# Patient Record
Sex: FEMALE | Race: WHITE | Employment: UNEMPLOYED | URBAN - METROPOLITAN AREA
[De-identification: names, ages, dates, MRNs, and addresses within clinical notes are randomized per-mention and may not be internally consistent; named-entity substitution may affect disease eponyms.]

---

## 2019-11-25 ENCOUNTER — HOSPITAL ENCOUNTER (EMERGENCY)
Age: 54
Discharge: HOME | End: 2019-11-25
Attending: EMERGENCY MEDICINE | Admitting: EMERGENCY MEDICINE
Payer: MEDICARE

## 2019-11-25 VITALS
HEART RATE: 89 BPM | SYSTOLIC BLOOD PRESSURE: 98 MMHG | BODY MASS INDEX: 38.57 KG/M2 | WEIGHT: 240 LBS | DIASTOLIC BLOOD PRESSURE: 56 MMHG | RESPIRATION RATE: 18 BRPM | TEMPERATURE: 98.1 F | HEIGHT: 66 IN | OXYGEN SATURATION: 91 %

## 2019-11-25 DIAGNOSIS — T78.40XA ALLERGIC REACTION, INITIAL ENCOUNTER: Primary | ICD-10-CM

## 2019-11-25 PROCEDURE — 74011250637 HC RX REV CODE- 250/637: Performed by: EMERGENCY MEDICINE

## 2019-11-25 PROCEDURE — A9270 NON-COVERED ITEM OR SERVICE: HCPCS | Performed by: EMERGENCY MEDICINE

## 2019-11-25 PROCEDURE — 74011636637 HC RX REV CODE- 636/637: Performed by: EMERGENCY MEDICINE

## 2019-11-25 PROCEDURE — 99285 EMERGENCY DEPT VISIT HI MDM: CPT

## 2019-11-25 RX ORDER — DIPHENHYDRAMINE HCL 12.5MG/5ML
12.5 LIQUID (ML) ORAL
Status: COMPLETED | OUTPATIENT
Start: 2019-11-25 | End: 2019-11-25

## 2019-11-25 RX ORDER — PREDNISOLONE 15 MG/5ML
1 SOLUTION ORAL
Status: DISCONTINUED | OUTPATIENT
Start: 2019-11-25 | End: 2019-11-25

## 2019-11-25 RX ORDER — PREDNISOLONE 15 MG/5ML
0.5 SOLUTION ORAL DAILY
Qty: 90 ML | Refills: 0 | Status: SHIPPED | OUTPATIENT
Start: 2019-11-25 | End: 2019-11-30

## 2019-11-25 RX ORDER — FAMOTIDINE 20 MG/1
20 TABLET, FILM COATED ORAL
Status: DISCONTINUED | OUTPATIENT
Start: 2019-11-25 | End: 2019-11-25

## 2019-11-25 RX ORDER — EPINEPHRINE 0.3 MG/.3ML
0.3 INJECTION SUBCUTANEOUS
Qty: 1 SYRINGE | Refills: 0 | Status: SHIPPED | OUTPATIENT
Start: 2019-11-25 | End: 2019-11-25

## 2019-11-25 RX ORDER — PREDNISOLONE 15 MG/5ML
1 SOLUTION ORAL DAILY
Qty: 183 ML | Refills: 0 | Status: SHIPPED | OUTPATIENT
Start: 2019-11-25 | End: 2019-11-25

## 2019-11-25 RX ORDER — PREDNISOLONE 15 MG/5ML
60 SOLUTION ORAL
Status: COMPLETED | OUTPATIENT
Start: 2019-11-25 | End: 2019-11-25

## 2019-11-25 RX ADMIN — PREDNISOLONE 60 MG: 15 SOLUTION ORAL at 12:14

## 2019-11-25 RX ADMIN — DIPHENHYDRAMINE HYDROCHLORIDE 12.5 MG: 25 LIQUID ORAL at 11:24

## 2019-11-25 NOTE — ED NOTES
Pt discharged at this time. VSS at time of discharge. Discharge instructions reviewed and follow up care discussed at this time. Discussed 1-3 hour wait time for transportation. Pt verbalized understanding and denies any questions at this time. Prescriptions x2 given with planned pick-up at hospital pharmacy. Pt dressed and ambulated out of ED at this time.  All care provided within pt best interest.

## 2019-11-25 NOTE — ED NOTES
Pt stating she is not comfortable taking the prescribed amount of prednisolone at this time. MD Ramona with another patient at this time but will be made aware.

## 2019-11-25 NOTE — ED TRIAGE NOTES
Pt presents via EMS for allergic reaction. Pt has allergy to yellow dyes and took prescribed singulair this AM. Approx 20-30min later pt began to have itching on her back and swelling under her tongue, nose, and bottom lip. Pt took 50mg benadryl and used prescribed Epipen at home. Pt immediately began feeling relief from allergic reaction symptoms but did begin to feel shaky. Pt requesting to use the bathroom at this time, escorted to the bathroom. Pt stable during ambulation.

## 2019-11-25 NOTE — ED PROVIDER NOTES
EMERGENCY DEPARTMENT HISTORY AND PHYSICAL EXAM      Date: 11/25/2019  Patient Name: Nakul Benavides    Please note that this dictation was completed with [employer], the computer voice recognition software. Quite often unanticipated grammatical, syntax, homophones, and other interpretive errors are inadvertently transcribed by the computer software. Please disregard these errors. Please excuse any errors that have escaped final proofreading. History of Presenting Illness     Chief Complaint   Patient presents with    Allergic Reaction       History Provided By: Patient    HPI: Nakul Benavides, 47 y.o. female, presented emergency department complaining of allergic reaction. Patient states she has multiple allergies, mostly to dyes and fillers and medications and reports that she started taking 2 medicines again last night, Singulair and one other medicine and then developed a rash on her chest and back, lip and tongue swelling and shortness of breath. She used her EpiPen and took 50 mg of Benadryl. She reports improvement of her symptoms, came in by EMS, symptoms have basically resolved except for a little perioral tingling. No shortness of breath or wheezing at this time. No lip or tongue swelling at this time, no rash. PCP: Jillian Kent MD    No current facility-administered medications on file prior to encounter. No current outpatient medications on file prior to encounter. Past History     Past Medical History:  Past Medical History:   Diagnosis Date    Arthritis     gout; osteoarthritis    Asthma     Cushing's syndrome ([address]     Diabetes ([address]     Gastrointestinal disorder     GERD, gastroporesis, diverticulitis    Hypertension     Thromboembolus ([address]        Past Surgical History:  Past Surgical History:   Procedure Laterality Date    ABDOMEN SURGERY PROC UNLISTED      HX CHOLECYSTECTOMY      HX ORTHOPAEDIC         Family History:  History reviewed.  No pertinent family history. Social History:  Social History     Tobacco Use    Smoking status: Former Smoker    Smokeless tobacco: Never Used   Substance Use Topics    Alcohol use: Yes     Comment: very rarely-2x/year    Drug use: Not on file       Allergies: Allergies   Allergen Reactions    Latex Anaphylaxis    Cephalosporins Anaphylaxis    Ciprofloxacin Anaphylaxis    Ibuprofen Anaphylaxis    Keflex [Cephalexin] Anaphylaxis    Macrobid [Nitrofurantoin Monohyd/M-Cryst] Anaphylaxis    Miralax [Polyethylene Glycol 3350] Anaphylaxis    Pcn [Penicillins] Anaphylaxis    Peanut Anaphylaxis    Percocet [Oxycodone-Acetaminophen] Anaphylaxis    Prednisone Anaphylaxis and Hives    Shrimp Anaphylaxis    Strawberry Anaphylaxis    Sulfa (Sulfonamide Antibiotics) Anaphylaxis         Review of Systems   Review of Systems   Constitutional: Negative for chills and fever. HENT: Negative for congestion and sore throat. Eyes: Negative for visual disturbance. Respiratory: Positive for cough and shortness of breath. Cardiovascular: Negative for chest pain and leg swelling. Gastrointestinal: Negative for abdominal pain, blood in stool, diarrhea and nausea. Endocrine: Negative for polyuria. Genitourinary: Negative for dysuria, flank pain, vaginal bleeding and vaginal discharge. Musculoskeletal: Negative for myalgias. Skin: Positive for rash. Allergic/Immunologic: Negative for immunocompromised state. Neurological: Negative for weakness and headaches. Psychiatric/Behavioral: Negative for confusion. Physical Exam   Physical Exam  Vitals signs and nursing note reviewed. Constitutional:       Appearance: She is well-developed. HENT:      Head: Normocephalic and atraumatic. Eyes:      General:         Right eye: No discharge. Left eye: No discharge. Conjunctiva/sclera: Conjunctivae normal.      Pupils: Pupils are equal, round, and reactive to light.    Neck:      Musculoskeletal: Normal range of motion and neck supple. Trachea: No tracheal deviation. Cardiovascular:      Rate and Rhythm: Normal rate and regular rhythm. Heart sounds: Normal heart sounds. No murmur. Pulmonary:      Effort: Pulmonary effort is normal. No respiratory distress. Breath sounds: Normal breath sounds. No wheezing or rales. Abdominal:      General: Bowel sounds are normal.      Palpations: Abdomen is soft. Tenderness: There is no tenderness. There is no guarding or rebound. Musculoskeletal: Normal range of motion. General: No tenderness or deformity. Skin:     General: Skin is warm and dry. Findings: No erythema or rash. Neurological:      Mental Status: She is alert and oriented to person, place, and time. Psychiatric:         Behavior: Behavior normal.         Diagnostic Study Results     Labs -   No results found for this or any previous visit (from the past 12 hour(s)). Radiologic Studies -   No orders to display     CT Results  (Last 48 hours)    None        CXR Results  (Last 48 hours)    None            Medical Decision Making   I am the first provider for this patient. I reviewed the vital signs, available nursing notes, past medical history, past surgical history, family history and social history. Vital Signs-Reviewed the patient's vital signs. No data found. Records Reviewed: Nursing Notes and Old Medical Records    Provider Notes (Medical Decision Making):   Patient states that she can take Solu-Medrol, but does not want a full 1 make per kick dose. She also does not want any other histamine receptor antagonist at this time other than a 12.5 mg Benadryl. I will have the patient to decide how much of the prednisolone she wants to take.   I will re-prescribe an EpiPen and have her take Benadryl and ranitidine for the next several days and I have given her a prescription for some prednisone that she can use at her discretion as she is wary about taking steroids. ED Course:   Initial assessment performed. The patients presenting problems have been discussed, and they are in agreement with the care plan formulated and outlined with them. I have encouraged them to ask questions as they arise throughout their visit. Critical Care Time:   none    Disposition:  DISCHARGE NOTE  Patients results have been reviewed with them. Patient and/or family have verbally conveyed their understanding and agreement of the patient's signs, symptoms, diagnosis, treatment and prognosis and additionally agree to follow up as recommended or return to the Emergency Room should their condition change or have any new concerns prior to their follow-up appointment. Patient verbally agrees with the care-plan and verbally conveys that all of their questions have been answered. Discharge instructions have also been provided to the patient with some educational information regarding their diagnosis as well a list of reasons why they would want to return to the ER prior to their follow-up appointment should their condition change. PLAN:  1. Discharge Medication List as of 11/25/2019 11:14 AM      START taking these medications    Details   EPINEPHrine (EPIPEN) 0.3 mg/0.3 mL injection 0.3 mL by IntraMUSCular route once as needed for Anaphylaxis for up to 1 dose., Normal, Disp-1 Syringe, R-0      prednisoLONE (PRELONE) 15 mg/5 mL syrup Take 36.5 mL by mouth daily for 5 days. , Normal, Disp-183 mL, R-0           2. Follow-up Information     Follow up With Specialties Details Why Contact Info    [facility] EMERGENCY DEPT Emergency Medicine  If symptoms worsen [address] [place] [address]  [phone]    Your PCP  Schedule an appointment as soon as possible for a visit            Return to ED if worse     Diagnosis     Clinical Impression:   1. Allergic reaction, initial encounter        Attestations:   This note was completed by Katie Reyes DO

## 2019-11-25 NOTE — DISCHARGE INSTRUCTIONS
Patient Education        Allergic Reaction: Care Instructions  Your Care Instructions    An allergic reaction is an excessive response from your immune system to a medicine, chemical, food, insect bite, or other substance. A reaction can range from mild to life-threatening. Some people have a mild rash, hives, and itching or stomach cramps. In severe reactions, swelling of your tongue and throat can close up your airway so that you cannot breathe. Follow-up care is a key part of your treatment and safety. Be sure to make and go to all appointments, and call your doctor if you are having problems. It's also a good idea to know your test results and keep a list of the medicines you take. How can you care for yourself at home? · If you know what caused your allergic reaction, be sure to avoid it. Your allergy may become more severe each time you have a reaction. · Take an over-the-counter antihistamine, such as cetirizine (Zyrtec) or loratadine (Claritin), to treat mild symptoms. Read and follow directions on the label. Some antihistamines can make you feel sleepy. Do not give antihistamines to a child unless you have checked with your doctor first. Mild symptoms include sneezing or an itchy or runny nose; an itchy mouth; a few hives or mild itching; and mild nausea or stomach discomfort. · Do not scratch hives or a rash. Put a cold, moist towel on them or take cool baths to relieve itching. Put ice packs on hives, swelling, or insect stings for 10 to 15 minutes at a time. Put a thin cloth between the ice pack and your skin. Do not take hot baths or showers. They will make the itching worse. · Your doctor may prescribe a shot of epinephrine to carry with you in case you have a severe reaction. Learn how to give yourself the shot and keep it with you at all times. Make sure it is not .   · Go to the emergency room every time you have a severe reaction, even if you have used your shot of epinephrine and are feeling better. Symptoms can come back after a shot. · Wear medical alert jewelry that lists your allergies. You can buy this at most drugstores. · If your child has a severe allergy, make sure that his or her teachers, babysitters, coaches, and other caregivers know about the allergy. They should have an epinephrine shot, know how and when to give it, and have a plan to take your child to the hospital.  When should you call for help? Give an epinephrine shot if:    · You think you are having a severe allergic reaction.     · You have symptoms in more than one body area, such as mild nausea and an itchy mouth.    After giving an epinephrine shot call 911, even if you feel better.   Call 911 if:    · You have symptoms of a severe allergic reaction. These may include:  ? Sudden raised, red areas (hives) all over your body. ? Swelling of the throat, mouth, lips, or tongue. ? Trouble breathing. ? Passing out (losing consciousness). Or you may feel very lightheaded or suddenly feel weak, confused, or restless.     · You have been given an epinephrine shot, even if you feel better.    Call your doctor now or seek immediate medical care if:    · You have symptoms of an allergic reaction, such as:  ? A rash or hives (raised, red areas on the skin). ? Itching. ? Swelling. ? Belly pain, nausea, or vomiting.    Watch closely for changes in your health, and be sure to contact your doctor if:    · You do not get better as expected. Where can you learn more? Go to http://torres-juancarlos.info/. Enter Q148 in the search box to learn more about \"Allergic Reaction: Care Instructions. \"  Current as of: April 7, 2019  Content Version: 12.2  © 1091-8191 [employer]. Care instructions adapted under license by [employer] (which disclaims liability or warranty for this information).  If you have questions about a medical condition or this instruction, always ask your healthcare professional. [address] any warranty or liability for your use of this information.

## 2019-12-16 ENCOUNTER — HOSPITAL ENCOUNTER (EMERGENCY)
Age: 54
Discharge: HOME | End: 2019-12-16
Attending: EMERGENCY MEDICINE
Payer: MEDICARE

## 2019-12-16 VITALS
TEMPERATURE: 98 F | OXYGEN SATURATION: 92 % | HEIGHT: 66 IN | BODY MASS INDEX: 37.77 KG/M2 | WEIGHT: 235 LBS | SYSTOLIC BLOOD PRESSURE: 100 MMHG | HEART RATE: 91 BPM | DIASTOLIC BLOOD PRESSURE: 60 MMHG | RESPIRATION RATE: 14 BRPM

## 2019-12-16 DIAGNOSIS — L29.9 ITCHING: ICD-10-CM

## 2019-12-16 DIAGNOSIS — R22.0 FACIAL SWELLING: ICD-10-CM

## 2019-12-16 DIAGNOSIS — L50.9 HIVES: ICD-10-CM

## 2019-12-16 DIAGNOSIS — T78.40XA ALLERGIC REACTION, INITIAL ENCOUNTER: Primary | ICD-10-CM

## 2019-12-16 LAB
GLUCOSE BLD STRIP.AUTO-MCNC: 277 MG/DL (ref 65–100)
SERVICE CMNT-IMP: ABNORMAL

## 2019-12-16 PROCEDURE — 74011250637 HC RX REV CODE- 250/637: Performed by: EMERGENCY MEDICINE

## 2019-12-16 PROCEDURE — 74011636637 HC RX REV CODE- 636/637: Performed by: EMERGENCY MEDICINE

## 2019-12-16 PROCEDURE — 99284 EMERGENCY DEPT VISIT MOD MDM: CPT

## 2019-12-16 PROCEDURE — 82962 GLUCOSE BLOOD TEST: CPT

## 2019-12-16 PROCEDURE — A9270 NON-COVERED ITEM OR SERVICE: HCPCS | Performed by: EMERGENCY MEDICINE

## 2019-12-16 RX ORDER — ONDANSETRON 4 MG/1
4 TABLET, FILM COATED ORAL 3 TIMES DAILY
COMMUNITY

## 2019-12-16 RX ORDER — ALLOPURINOL 100 MG/1
100 TABLET ORAL DAILY
COMMUNITY

## 2019-12-16 RX ORDER — RANITIDINE 150 MG/1
150 TABLET, FILM COATED ORAL DAILY
COMMUNITY

## 2019-12-16 RX ORDER — FAMOTIDINE 20 MG/1
20 TABLET, FILM COATED ORAL ONCE
Status: COMPLETED | OUTPATIENT
Start: 2019-12-16 | End: 2019-12-16

## 2019-12-16 RX ORDER — PROMETHAZINE HYDROCHLORIDE 25 MG/1
25 TABLET ORAL
Status: COMPLETED | OUTPATIENT
Start: 2019-12-16 | End: 2019-12-16

## 2019-12-16 RX ORDER — FLUTICASONE PROPIONATE AND SALMETEROL 100; 50 UG/1; UG/1
1 POWDER RESPIRATORY (INHALATION) 2 TIMES DAILY
COMMUNITY

## 2019-12-16 RX ORDER — NEBIVOLOL 10 MG/1
10 TABLET ORAL DAILY
COMMUNITY

## 2019-12-16 RX ORDER — LEVALBUTEROL INHALATION SOLUTION 0.63 MG/3ML
0.63 SOLUTION RESPIRATORY (INHALATION)
COMMUNITY

## 2019-12-16 RX ORDER — ONDANSETRON 4 MG/1
4 TABLET, ORALLY DISINTEGRATING ORAL
Status: DISCONTINUED | OUTPATIENT
Start: 2019-12-16 | End: 2019-12-16

## 2019-12-16 RX ORDER — PREDNISOLONE 15 MG/5ML
20 SOLUTION ORAL DAILY
Status: DISCONTINUED | OUTPATIENT
Start: 2019-12-16 | End: 2019-12-16 | Stop reason: HOSPADM

## 2019-12-16 RX ORDER — FAMOTIDINE 20 MG/1
20 TABLET, FILM COATED ORAL
Status: DISCONTINUED | OUTPATIENT
Start: 2019-12-16 | End: 2019-12-16

## 2019-12-16 RX ADMIN — FAMOTIDINE 20 MG: 20 TABLET ORAL at 08:59

## 2019-12-16 RX ADMIN — PREDNISOLONE 20 MG: 15 SOLUTION ORAL at 08:59

## 2019-12-16 RX ADMIN — PROMETHAZINE HYDROCHLORIDE 25 MG: 25 TABLET ORAL at 08:59

## 2019-12-16 NOTE — ED NOTES
All discharge paperwork reviewed with patient, nurse and MD and she denies any need for further explanation regarding these instructions.  Denies need for wheelchair and ambulates out of ED

## 2019-12-16 NOTE — ED NOTES
Patient states that she took benadryl 50mg and prednisolone as well after feeling the numbness and tingling in her mouth.

## 2019-12-16 NOTE — ED NOTES
Request from Primary RN to assist with d/c home, patient d/c but declines to go home, states need to stay in room. Offered patient wheelchair to waiting room, she states she can use her crutches, but not ready to go yet, waiting patient continues to decline to get up, daughter at bedside. Flor present. Patient continues  being disgruntled and angry to staff.

## 2019-12-16 NOTE — ED TRIAGE NOTES
Pt is able to speak in complete sentences, no visual swelling of mouth, maintaining airway, walking with crutches without shortness of breath

## 2020-02-04 ENCOUNTER — HOSPITAL ENCOUNTER (OUTPATIENT)
Dept: MRI IMAGING | Age: 55
Discharge: HOME | End: 2020-02-04
Payer: MEDICARE

## 2020-02-04 DIAGNOSIS — S83.412A SPRAIN OF MEDIAL COLLATERAL LIGAMENT OF LEFT KNEE: ICD-10-CM

## 2020-02-04 DIAGNOSIS — S83.207A TEAR OF MENISCUS OF LEFT KNEE: ICD-10-CM

## 2020-02-04 PROCEDURE — 73721 MRI JNT OF LWR EXTRE W/O DYE: CPT

## 2022-11-08 ENCOUNTER — APPOINTMENT (OUTPATIENT)
Dept: URBAN - NONMETROPOLITAN AREA CLINIC 42 | Age: 57
Setting detail: DERMATOLOGY
End: 2022-11-10

## 2022-11-08 DIAGNOSIS — L85.8 OTHER SPECIFIED EPIDERMAL THICKENING: ICD-10-CM

## 2022-11-08 DIAGNOSIS — L738 OTHER SPECIFIED DISEASES OF HAIR AND HAIR FOLLICLES: ICD-10-CM

## 2022-11-08 DIAGNOSIS — L663 OTHER SPECIFIED DISEASES OF HAIR AND HAIR FOLLICLES: ICD-10-CM

## 2022-11-08 DIAGNOSIS — H00.1 CHALAZION: ICD-10-CM

## 2022-11-08 DIAGNOSIS — L60.8 OTHER NAIL DISORDERS: ICD-10-CM

## 2022-11-08 PROBLEM — L02.02 FURUNCLE OF FACE: Status: ACTIVE | Noted: 2022-11-08

## 2022-11-08 PROBLEM — H00.15 CHALAZION LEFT LOWER EYELID: Status: ACTIVE | Noted: 2022-11-08

## 2022-11-08 PROCEDURE — OTHER COUNSELING: OTHER

## 2022-11-08 PROCEDURE — OTHER OTC TREATMENT REGIMEN: OTHER

## 2022-11-08 PROCEDURE — OTHER PRESCRIPTION: OTHER

## 2022-11-08 PROCEDURE — 99203 OFFICE O/P NEW LOW 30 MIN: CPT

## 2022-11-08 PROCEDURE — OTHER ADDITIONAL NOTES: OTHER

## 2022-11-08 RX ORDER — CLINDAMYCIN PHOSPHATE 10 MG/ML
LOTION TOPICAL
Qty: 60 | Refills: 1 | Status: ERX | COMMUNITY
Start: 2022-11-08

## 2022-11-08 RX ORDER — ERYTHROMYCIN 5 MG/G
OINTMENT OPHTHALMIC
Qty: 1 | Refills: 0 | Status: ERX | COMMUNITY
Start: 2022-11-08

## 2022-11-08 ASSESSMENT — LOCATION ZONE DERM
LOCATION ZONE: EYELID
LOCATION ZONE: ARM
LOCATION ZONE: FACE
LOCATION ZONE: FINGERNAIL

## 2022-11-08 ASSESSMENT — LOCATION DETAILED DESCRIPTION DERM
LOCATION DETAILED: LEFT INDEX FINGERNAIL
LOCATION DETAILED: LEFT INFERIOR CENTRAL MALAR CHEEK
LOCATION DETAILED: LEFT LATERAL INFERIOR EYELID
LOCATION DETAILED: RIGHT INFERIOR CENTRAL MALAR CHEEK
LOCATION DETAILED: LEFT ELBOW

## 2022-11-08 ASSESSMENT — LOCATION SIMPLE DESCRIPTION DERM
LOCATION SIMPLE: RIGHT CHEEK
LOCATION SIMPLE: LEFT ELBOW
LOCATION SIMPLE: LEFT CHEEK
LOCATION SIMPLE: LEFT INDEX FINGERNAIL
LOCATION SIMPLE: LEFT INFERIOR EYELID

## 2022-11-08 NOTE — PROCEDURE: ADDITIONAL NOTES
Additional Notes: Recommendation to Occouplastic if ointment doesn’t help.     Patient has had a reoccurring cyst in the same eye and was treated at a \[facility]\"" in [place]. Additional Notes: Recommendation to Occouplastic if ointment doesn’t help.     Patient has had a reoccurring cyst in the same eye and was treated at a [facility] in [place].

## 2022-11-08 NOTE — PROCEDURE: OTC TREATMENT REGIMEN
Patient Specific Otc Recommendations (Will Not Stick From Patient To Patient): Use oil free mineral sunscreen.
Detail Level: Zone

## 2024-09-07 NOTE — ED PROVIDER NOTES
EMERGENCY DEPARTMENT HISTORY AND PHYSICAL EXAM      Date: 12/16/2019  Patient Name: Leo Smith  Patient Age and Sex: 47 y.o. 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 Patient reports that she recently started taking a new formulation of allopurinol. She took 1 dose yesterday, and then took another dose last night. This morning she started to experience lip swelling, hives, and reports minimal shortness of breath. She took a dose of steroids and 50 mg of Benadryl. She initially had improvement of her symptoms, and then she reports that the lip swelling returned. Reports that her hives have now resolved, her lip swelling has improved, and she has no shortness of breath. She does report feeling nausea related to the steroids. Patient states she has had 1 anaphylaxis reaction before that required epinephrine. She did not take her epinephrine today because she felt that her symptoms were improving. No allergist.    There are no other complaints, changes, or physical findings at this time. PCP: Other, MD Jillian    No current facility-administered medications on file prior to encounter. Current Outpatient Medications on File Prior to Encounter   Medication Sig Dispense Refill    allopurinol (ZYLOPRIM) 100 mg tablet Take 100 mg by mouth daily.  raNITIdine (ZANTAC) 150 mg tablet Take 150 mg by mouth daily.  nebivolol (BYSTOLIC) 10 mg tablet Take 10 mg by mouth daily.  ondansetron hcl (ZOFRAN) 4 mg tablet Take 4 mg by mouth three (3) times daily.  fluticasone propion-salmeterol (ADVAIR DISKUS) 100-50 mcg/dose diskus inhaler Take 1 Puff by inhalation two (2) times a day.  levalbuterol (XOPENEX) 0.63 mg/3 mL nebu 0.63 mg by Nebulization route every four (4) hours as needed. Past History     Past Medical History:  Past Medical History:   Diagnosis Date    Arthritis     gout; osteoarthritis    Asthma     [facility]'s [facility]     Diabetes ([facility]     Gastrointestinal disorder     GERD, gastroporesis, diverticulitis    Hypertension     Thromboembolus ([facility]        Past Surgical History:  Past Surgical History:   Procedure Laterality Date    ABDOMEN SURGERY PROC UNLISTED      HX CHOLECYSTECTOMY      HX ORTHOPAEDIC         Family History:  No family history on file. Social History:  Social History     Tobacco Use    Smoking status: Former Smoker    Smokeless tobacco: Never Used   Substance Use Topics    Alcohol use: Yes     Comment: very rarely-2x/year    Drug use: Not on file       Allergies: Allergies   Allergen Reactions    Latex Anaphylaxis    Cephalosporins Anaphylaxis    Ciprofloxacin Anaphylaxis    Ibuprofen Anaphylaxis    Keflex [Cephalexin] Anaphylaxis    Macrobid [Nitrofurantoin Monohyd/M-Cryst] Anaphylaxis    Miralax [Polyethylene Glycol 3350] Anaphylaxis    Pcn [Penicillins] Anaphylaxis    Peanut Anaphylaxis    Percocet [Oxycodone-Acetaminophen] Anaphylaxis    Prednisone Anaphylaxis and Hives    Shrimp Anaphylaxis    Strawberry Anaphylaxis    Sulfa (Sulfonamide Antibiotics) Anaphylaxis         Review of Systems   Constitutional: No  fever,  No  Headache, + lip swelling  Skin: +  rash, No  jaundice  HEENT: No  nasal congestion, No  eye drainage.    Resp: No cough,  No  wheezing  CV: No chest pain, No  palpitations  GI: No vomiting,  No  diarrhea.,  No  constipation  : No dysuria,  No  hematuria  MSK: No joint pain,  No  trauma  Neuro: No numbness, No  tingling  Psych: No suicidal, No paranoid      Physical Exam     Patient Vitals for the past 12 hrs:   Temp Pulse Resp BP SpO2   12/16/19 0925 98 °F (36.7 °C) 91 14 100/60 92 %   12/16/19 0740 98.1 °F (36.7 °C) 71 16 136/74 97 %     General: alert, No acute distress  Eyes: EOMI, normal conjunctiva  ENT: moist mucous membranes. No facial swelling, uvula midline, tonsils are symmetric  Neck: Active, full ROM of neck. No stridor. Skin: No rashes. no jaundice              Lungs: Equal chest expansion. no respiratory distress. clear to auscultation bilaterally No accessory muscle usage, no wheezing  Heart: regular rate     no peripheral edema   2+ radial pulses and DPs bilaterally  Abd:  non distended soft, nontender. No rebound tenderness. No guarding  Back: Full ROM  MSK: Full, active ROM in all 4 extremities. Neuro: Person, Place, Time and Situation; normal speech;   Psych: Cooperative with exam; Appropriate mood and affect             Diagnostic Study Results     Labs -     Recent Results (from the past 12 hour(s))   GLUCOSE, POC    Collection Time: 12/16/19  8:12 AM   Result Value Ref Range    Glucose (POC) 277 (H) 65 - 100 mg/dL    Performed by Heber Fearing        Radiologic Studies -   No orders to display     CT Results  (Last 48 hours)    None        CXR Results  (Last 48 hours)    None            Medical Decision Making     Differential Diagnosis: Allergic reaction, anaphylaxis, hives, medication effect    I reviewed the vital signs, available nursing notes, past medical history, past surgical history, family history and social history and old medical records. On my interpretation, Laboratory workup is significant for glucose is elevated at 277    Management/ED course: Patient presents today after concern for allergic reaction to a new formulation the patient reported symptoms of hives, lip swelling that had resolved by the time she arrived in the emergency department. The patient had taken Benadryl and steroids.   I gave her additional steroids, and famotidine for her symptoms. She had no return of symptoms and remains asymptomatic after observation for greater than 2 hours. Patient encouraged to follow-up with an allergist and was provided with information regarding floxacillin when to use her EpiPen. Patient is discharged. Dispo: Discharged. The patient has been re-evaluated and is ready for discharge. Reviewed available results with patient. Counseled patient on diagnosis and care plan. Patient has expressed understanding, and all questions have been answered. Patient agrees with plan and agrees to follow up as recommended, or to return to the ED if their symptoms worsen. Discharge instructions have been provided and explained to the patient, along with reasons to return to the ED. PLAN:  Discharge Medication List as of 12/16/2019  9:28 AM        2. Follow-up Information     Follow up With Specialties Details Why Contact Info    Allergy [address] [place] [address] [address] [place] [address] [facility]    [address] [place] [address] [phone] [facility] [address] [phone]        3. Return to ED if worse     Diagnosis     Clinical Impression:   1. Allergic reaction, initial encounter    2. Facial swelling    3. Hives    4.  Itching        Attestations:    Eloise Jim MD Statement Selected